# Patient Record
Sex: MALE | Race: WHITE
[De-identification: names, ages, dates, MRNs, and addresses within clinical notes are randomized per-mention and may not be internally consistent; named-entity substitution may affect disease eponyms.]

---

## 2018-10-29 LAB
BUN BLD-MCNC: 11 MG/DL (ref 7–18)
GLUCOSE SERPLBLD-MCNC: 332 MG/DL (ref 74–106)
HCT VFR BLD CALC: 43.3 % (ref 39.6–49)
LYMPHOCYTES # SPEC AUTO: 2.7 K/UL (ref 0.7–4.9)
MCH RBC QN AUTO: 30.1 PG (ref 27–35)
MCV RBC: 88.2 FL (ref 80–100)
PMV BLD: 9.6 FL (ref 7.6–11.3)
POTASSIUM SERPL-SCNC: 4 MMOL/L (ref 3.5–5.1)
RBC # BLD: 4.91 M/UL (ref 4.33–5.43)

## 2018-10-29 NOTE — RAD REPORT
EXAM DESCRIPTION:  RAD - Chest Pa And Lat (2 Views) - 10/29/2018 9:39 am

 

CLINICAL HISTORY:  PRE OP

Chest pain.

 

COMPARISON:  No comparisons

 

FINDINGS:  The lungs are clear. The heart is normal in size. No displaced fractures.

 

IMPRESSION:  No acute or concerning finding suspected.

## 2018-10-29 NOTE — EKG
Test Date:    2018-10-29               Test Time:    09:47:14

Technician:   MARY BETH                                     

                                                     

MEASUREMENT RESULTS:                                       

Intervals:                                           

Rate:         66                                     

HI:           150                                    

QRSD:         96                                     

QT:           384                                    

QTc:          402                                    

Axis:                                                

P:            64                                     

HI:           150                                    

QRS:          98                                     

T:            61                                     

                                                     

INTERPRETIVE STATEMENTS:                                       

                                                     

Normal sinus rhythm

Rightward axis

Borderline ECG

No previous ECG available for comparison



Electronically Signed On 10-29-18 11:19:04 CDT by Francisco Delgadillo

## 2018-10-31 ENCOUNTER — HOSPITAL ENCOUNTER (OUTPATIENT)
Dept: HOSPITAL 97 - OR | Age: 43
Discharge: HOME | End: 2018-10-31
Attending: SURGERY
Payer: COMMERCIAL

## 2018-10-31 DIAGNOSIS — Z83.3: ICD-10-CM

## 2018-10-31 DIAGNOSIS — E66.01: ICD-10-CM

## 2018-10-31 DIAGNOSIS — K43.6: Primary | ICD-10-CM

## 2018-10-31 DIAGNOSIS — Z88.6: ICD-10-CM

## 2018-10-31 DIAGNOSIS — E11.9: ICD-10-CM

## 2018-10-31 PROCEDURE — 80048 BASIC METABOLIC PNL TOTAL CA: CPT

## 2018-10-31 PROCEDURE — 85025 COMPLETE CBC W/AUTO DIFF WBC: CPT

## 2018-10-31 PROCEDURE — 71046 X-RAY EXAM CHEST 2 VIEWS: CPT

## 2018-10-31 PROCEDURE — 88302 TISSUE EXAM BY PATHOLOGIST: CPT

## 2018-10-31 PROCEDURE — 93005 ELECTROCARDIOGRAM TRACING: CPT

## 2018-10-31 PROCEDURE — 82962 GLUCOSE BLOOD TEST: CPT

## 2018-10-31 PROCEDURE — 0WUF0JZ SUPPLEMENT ABDOMINAL WALL WITH SYNTHETIC SUBSTITUTE, OPEN APPROACH: ICD-10-PCS

## 2018-10-31 PROCEDURE — 36415 COLL VENOUS BLD VENIPUNCTURE: CPT

## 2018-10-31 NOTE — DS
Date of Discharge:  10/31/2018



Diagnosis:  Tender incarcerated ventral hernia.



Procedures:  Open repair of a tender incarcerated ventral hernia with mesh.



Disposition:  Home.



Activity:  As tolerated.  No heavy lifting.



Followup:  Follow up in my office in 1 week.  Call for appointment on [phone].  Keep area dry for 48
 hours, then may shower.  The patient will apply gauze, replace the dressings after that daily.



Medications:  See orders.





EUGENIO/BERNADINE

DD:  10/31/2018 11:41:24Voice ID:  [id number]

DT:  10/31/2018 21:47:38Report ID:  [id number]

## 2018-10-31 NOTE — OP
Date of Procedure:  10/31/2018



Surgeon:  Ta Soto MD



Assistant:  LAYNE Holt.



Preoperative Diagnoses:  Tender incarcerated ventral hernia, morbid obesity.



Postoperative Diagnoses:  Tender incarcerated ventral hernia, morbid obesity.



Procedure:  Open repair of a tender incarcerated ventral hernia with mesh.



Estimated Blood Loss:  Less than 20 cc.



Specimen:  Hernia sac.



Findings:  Incarcerated omentum and hernia sac.



Anesthesia:  General plus local.



Indication:  This is the case of a 43-year-old patient, comes to us with a large hernia over the vent
ral region, incarcerated, tender.  The patient was fully explained the benefits, alternatives, and ri
sks of repair which include, but not limited to infection, bleeding, damage to adjacent structures, a
nesthesia complication, recurrence, MI, and even death.  He also understands this may not relieve his
 symptoms.  He might need more than one surgical intervention.  He understands the importance of losi
ng weight.  He is trying.  He understands we might have to use mesh over that region with pros and co
ns fully explained to the patient.  All the questions were answered to his satisfaction.  He did allo
w me to use mesh.



Description Of Procedure:  The patient was brought to the operating room, placed in supine position. 
 Anesthesia was done without complication.  Abdominal area was prepped and draped in usual sterile fa
shion.  Marcaine 0.5% injected for local anesthetic.  Prior to that, time-out was called.  Incision w
as made over the ventral region.  Incision was carried down to fascia.  The patient has a large herni
a present.  He is morbidly obese with a large abdominal wall and a lot of volume was on that area.  T
he opening is small enough to probably be able to close, so we opened the hernia sac.  Suture ligated
 the omentum coming to the area, reduced the rest of the omentum back into the abdominal cavity after
 fully inspected to make sure there was no bleeding.  Hernia sac was removed.  The fascia edges need 
some reinforcement with the mesh due to the consistency of it.  So, we decided to use the Ventralex m
esh after the fascial edges were completely clear and the strap coming through the incision.  The mes
h was secured to the fascia using #2-0 Prolene in multiple locations.  Then we made sure the bowel wa
s not in between.  The area was irrigated.  Then, after that, I proceeded to clean the fascial edges 
and made sure the hernia sac was completely removed.  The straps were removed off the mesh.  The fasc
ial edges were approximated with a #1 PDS in a figure-of-eight fashion multiple times.  Area was irri
gated.  Subcutaneous tissue was closed with 3-0 chromic and the skin was approximated with staples.  
Sponge count and instrument counts were correct.  The patient tolerated the procedure well.  The sae
ent was sent to recovery room in stable condition.





EUGENIO/BERNADINE

DD:  10/31/2018 11:41:24Voice ID:  [id number]

DT:  10/31/2018 21:36:40Report ID:  [id number]

## 2018-10-31 NOTE — P.BOP
Preoperative diagnosis: tender incarcerated ventral hernia


Postoperative diagnosis: same


Primary procedure: Open repair of tender incarcerated ventral hernia with mesh


Assistant: EWELINA RIVERA (rnfa)


Estimated blood loss: <20cc


Specimen: hernia sac


Findings: as above


Anesthesia: General


Complications: None


Implants: ventralex


Transferred to: Recovery Room


Condition: Good